# Patient Record
Sex: FEMALE | Race: BLACK OR AFRICAN AMERICAN | NOT HISPANIC OR LATINO | Employment: UNEMPLOYED | URBAN - METROPOLITAN AREA
[De-identification: names, ages, dates, MRNs, and addresses within clinical notes are randomized per-mention and may not be internally consistent; named-entity substitution may affect disease eponyms.]

---
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
## 2024-02-19 ENCOUNTER — TELEPHONE (OUTPATIENT)
Dept: FAMILY MEDICINE | Facility: CLINIC | Age: 19
End: 2024-02-19
Payer: COMMERCIAL

## 2024-02-19 NOTE — TELEPHONE ENCOUNTER
[facility] phone call message- patient requesting to speak with PCP or provider:    PCP: Gina Rowe    Additional Comments: Patient seeking callback from PCP.    Is a call back needed? Yes    Patient informed that it may take up to 2 business days to hear back from PCP:Yes    OK to leave a message on voice mail? Yes    Primary language: English      needed? No    Call taken on February 19, 2024 at 2:56 PM by Sae Sierra

## 2024-02-21 NOTE — TELEPHONE ENCOUNTER
Attempted to reach patient with no answer. Let generic message to call back. Let patient know that the doctor has not seen her in a while and would like her to schedule an appointment in clinic.   Erendira Gallardo RN

## 2024-11-18 ENCOUNTER — OFFICE VISIT (OUTPATIENT)
Dept: FAMILY MEDICINE | Facility: CLINIC | Age: 19
End: 2024-11-18
Payer: COMMERCIAL

## 2024-11-18 VITALS
BODY MASS INDEX: 16.61 KG/M2 | HEART RATE: 86 BPM | OXYGEN SATURATION: 100 % | DIASTOLIC BLOOD PRESSURE: 71 MMHG | SYSTOLIC BLOOD PRESSURE: 107 MMHG | TEMPERATURE: 98.1 F | HEIGHT: 67 IN | RESPIRATION RATE: 17 BRPM | WEIGHT: 105.8 LBS

## 2024-11-18 DIAGNOSIS — R53.83 OTHER FATIGUE: ICD-10-CM

## 2024-11-18 DIAGNOSIS — G43.109 MIGRAINE WITH VISUAL AURA: Primary | ICD-10-CM

## 2024-11-18 RX ORDER — TOPIRAMATE 25 MG/1
25 TABLET, FILM COATED ORAL 2 TIMES DAILY
Qty: 90 TABLET | Refills: 1 | Status: SHIPPED | OUTPATIENT
Start: 2024-11-18

## 2024-11-18 RX ORDER — MAGNESIUM OXIDE 400 MG/1
400 TABLET ORAL DAILY
Qty: 90 TABLET | Refills: 3 | Status: SHIPPED | OUTPATIENT
Start: 2024-11-18

## 2024-11-18 RX ORDER — CHOLECALCIFEROL (VITAMIN D3) 50 MCG
1 TABLET ORAL DAILY
Qty: 365 TABLET | Refills: 0 | Status: SHIPPED | OUTPATIENT
Start: 2024-11-18

## 2024-11-18 RX ORDER — SUMATRIPTAN 50 MG/1
50 TABLET, FILM COATED ORAL
Qty: 90 TABLET | Refills: 0 | Status: SHIPPED | OUTPATIENT
Start: 2024-11-18

## 2024-11-18 ASSESSMENT — ANXIETY QUESTIONNAIRES
5. BEING SO RESTLESS THAT IT IS HARD TO SIT STILL: NOT AT ALL
6. BECOMING EASILY ANNOYED OR IRRITABLE: NOT AT ALL
3. WORRYING TOO MUCH ABOUT DIFFERENT THINGS: NOT AT ALL
IF YOU CHECKED OFF ANY PROBLEMS ON THIS QUESTIONNAIRE, HOW DIFFICULT HAVE THESE PROBLEMS MADE IT FOR YOU TO DO YOUR WORK, TAKE CARE OF THINGS AT HOME, OR GET ALONG WITH OTHER PEOPLE: NOT DIFFICULT AT ALL
7. FEELING AFRAID AS IF SOMETHING AWFUL MIGHT HAPPEN: NOT AT ALL
GAD7 TOTAL SCORE: 1
1. FEELING NERVOUS, ANXIOUS, OR ON EDGE: NOT AT ALL
2. NOT BEING ABLE TO STOP OR CONTROL WORRYING: NOT AT ALL
7. FEELING AFRAID AS IF SOMETHING AWFUL MIGHT HAPPEN: NOT AT ALL
GAD7 TOTAL SCORE: 1
GAD7 TOTAL SCORE: 1
4. TROUBLE RELAXING: SEVERAL DAYS
8. IF YOU CHECKED OFF ANY PROBLEMS, HOW DIFFICULT HAVE THESE MADE IT FOR YOU TO DO YOUR WORK, TAKE CARE OF THINGS AT HOME, OR GET ALONG WITH OTHER PEOPLE?: NOT DIFFICULT AT ALL

## 2024-11-18 ASSESSMENT — PATIENT HEALTH QUESTIONNAIRE - PHQ9
SUM OF ALL RESPONSES TO PHQ QUESTIONS 1-9: 3
10. IF YOU CHECKED OFF ANY PROBLEMS, HOW DIFFICULT HAVE THESE PROBLEMS MADE IT FOR YOU TO DO YOUR WORK, TAKE CARE OF THINGS AT HOME, OR GET ALONG WITH OTHER PEOPLE: SOMEWHAT DIFFICULT
SUM OF ALL RESPONSES TO PHQ QUESTIONS 1-9: 3

## 2024-11-18 ASSESSMENT — PAIN SCALES - GENERAL: PAINLEVEL_OUTOF10: NO PAIN (0)

## 2024-11-18 NOTE — PROGRESS NOTES
Preceptor Attestation:    I discussed the patient with the resident and evaluated the patient in person. I have verified the content of the note, which accurately reflects my assessment of the patient and the plan of care.   Supervising Physician:  Luis A Jack MD.

## 2024-11-18 NOTE — PROGRESS NOTES
Assessment & Plan     Migraine with visual aura  Frontal unilateral headaches with associated vision changes and nausea consistent with migraines with aura occurring at least half of the days per month.  No signs of intracranial abnormality given normal neurologic exam.  Does not appear to be cluster headache or tension headache.  Does not have status migrainosus or intractable headaches. Minimal improvement with magnesium, riboflavin, Tylenol, and ibuprofen.  Discussed preventative and abortive therapies for migraines with patient.  Given patient's low-normal blood pressure, do not recommend beta-blocker as first-line.  No comorbid mental health conditions given PHQ-9 and DAVI-7 scores today.  Considered amitriptyline as possible first-line therapy as well.  Given severity of migraines, recommended starting topiramate at 25 mg with plan to increase to 50 mg in 1 week for prevention.  Given minimal improvement with NSAID therapy, opted to start abortive therapy with sumatriptan 50 mg as needed for migraine headache which she will start today as needed.  Discussed waiting 1 week after starting sumatriptan to start topiramate to discern side effect profile and efficacy.  If no improvement with sumatriptan at 50 mg can increase to 100 mg per dose for up to 200 mg/day.  Can also consider injectable sumatriptan as this has increased efficacy vs switching to a different triptan.  Can also consider antinausea such as Compazine and Reglan as adjunctive therapy.  If no improvement with topiramate at 50 mg, can increase to 100 mg daily.  If intolerable side effects with topiramate, can consider amitriptyline or propranolol as preventative therapy.  - magnesium oxide (MAG-OX) 400 MG tablet; Take 1 tablet (400 mg) by mouth daily.  - riboflavin (B-2-400) 400 MG CAPS; Take 1 capsule by mouth daily.  - SUMAtriptan (IMITREX) 50 MG tablet; Take 1 tablet (50 mg) by mouth at onset of headache for migraine. May repeat in 2 hours. Max  4 tablets/24 hours.  - topiramate (TOPAMAX) 25 MG tablet; Take 1 tablet (25 mg) by mouth 2 times daily. Wait for one week after starting imitrex, then start topamax. Take one tablet (25mg) once daily for one week, then increase to twice daily (50mg total)    Other fatigue  Refill of vitamin D prescribed today.  - vitamin D3 (CHOLECALCIFEROL) 50 mcg (2000 units) tablet; Take 1 tablet (50 mcg) by mouth daily.    Offered COVID-19 and influenza vaccinations - patient declined due to patient preference    Return in about 2 weeks (around 12/2/2024) for Follow up, with me, with any available provider in [facility].    Subjective   Elle is a 19 year old, presenting for the following health issues:  Headache (Every 2 days a migraine starts, frontal area, vision blurs, has orbs with auras, nausea. Has used tylenol and advil)      11/18/2024     8:55 AM   Additional Questions   Roomed by kasandra   Accompanied by self         11/18/2024    Information    services provided? No        HPI     Migraines  -hx of migraines, occurred every once in a while since high school for a few years at least  -occurring half of the days per month  -were happening once per month, now every other day for the past couple weeks  -has to stay home from school  -frontal, behind eyes, eye swelling, sometimes all over but usual the front  -usually left sided  -sometimes sharp, throbbing  -severity 10/10, at least 7-8/10  -last 3-24 hours  -associated nausea, blurred vision, dark spots in vision, photophobia, sound sensitivity  -improved with sleeping in dark room  -no vomiting  -no numbness or tingling, no focal weakness  -no speech difficulties  -tylenol and ibuprofen, place ice on head which all help a little  -no known triggers  -no increased stress  -no changes to activity  -drinks 1-2 cups of coffee per day  -gets 4-5 hours of sleep, feels well-rested most of the time    Review of Systems  Constitutional, HEENT, cardiovascular,  "pulmonary, gi and gu systems are negative, except as otherwise noted.      Objective    /71   Pulse 86   Temp 98.1  F (36.7  C) (Oral)   Resp 17   Ht 1.702 m (5' 7\")   Wt 48 kg (105 lb 12.8 oz)   SpO2 100%   BMI 16.57 kg/m    Body mass index is 16.57 kg/m .  Physical Exam   GENERAL: alert and no distress  EYES: Eyes grossly normal to inspection, PERRL and conjunctivae and sclerae normal  HENT: mouth without ulcers or lesions  NECK: no adenopathy, no asymmetry, masses, or scars  RESP: lungs clear to auscultation - no rales, rhonchi or wheezes  CV: regular rate and rhythm, normal S1 S2, no S3 or S4, no murmur, click or rub, no peripheral edema  MS: no gross musculoskeletal defects noted, no edema  SKIN: no suspicious lesions or rashes  NEURO: Normal strength and tone, mentation intact and speech normal, CN II-XII intact  PSYCH: mentation appears normal, affect normal/bright          Signed Electronically by: Steph Haas MD    Answers submitted by the patient for this visit:  Patient Health Questionnaire (Submitted on 11/18/2024)  If you checked off any problems, how difficult have these problems made it for you to do your work, take care of things at home, or get along with other people?: Somewhat difficult  PHQ9 TOTAL SCORE: 3  Patient Health Questionnaire (G7) (Submitted on 11/18/2024)  DAVI 7 TOTAL SCORE: 1    "

## 2024-11-18 NOTE — PATIENT INSTRUCTIONS
Patient Education   Here is the plan from today's visit    1. Migraine with visual aura (Primary)  Starting today, you CAN take imitrex/sumatriptan when you have a headache/migraine only. You can take one tab at the start and repeat every 2 hours as needed for headache. Do not take more than 4 tabs in 24 hours.    Starting 11/25, take topiramate/topamax one tab once daily for one week. On 12/2, increase the dose to one tab twice per day (one in morning, one at night).     Come back to clinic in 2 weeks to see how the medicines are working and take note of any side effects (most common side effects are drowsiness and nausea).     - magnesium oxide (MAG-OX) 400 MG tablet; Take 1 tablet (400 mg) by mouth daily.  Dispense: 90 tablet; Refill: 3  - riboflavin (B-2-400) 400 MG CAPS; Take 1 capsule by mouth daily.  Dispense: 90 capsule; Refill: 3  - SUMAtriptan (IMITREX) 50 MG tablet; Take 1 tablet (50 mg) by mouth at onset of headache for migraine. May repeat in 2 hours. Max 4 tablets/24 hours.  Dispense: 90 tablet; Refill: 0  - topiramate (TOPAMAX) 25 MG tablet; Take 1 tablet (25 mg) by mouth 2 times daily. Wait for one week after starting imitrex, then start topamax. Take one tablet (25mg) once daily for one week, then increase to twice daily (50mg total)  Dispense: 90 tablet; Refill: 1    2. Other fatigue  Refill of vitamin D.  - vitamin D3 (CHOLECALCIFEROL) 50 mcg (2000 units) tablet; Take 1 tablet (50 mcg) by mouth daily.  Dispense: 365 tablet; Refill: 0    Please call or return to clinic if your symptoms don't go away.    Follow up plan  No follow-ups on file.    Thank you for coming to [facility] today.  Lab Testing:  **If you had lab testing today and your results are reassuring or normal they will be mailed to you or sent through [facility] within 7 days.   **If the lab tests need quick action we will call you with the results.  **If you are having labs done on a different day, please call [phone] to  schedule at [facility] or [phone] for other [facility] locations. Labs do not offer walk-in appointments.  The phone number we will call with results is # [phone] (home) . If this is not the best number please call our clinic and change the number.  Medication Refills:  If you need any refills please call your pharmacy and they will contact us.   If you need to  your refill at a new pharmacy, please contact the new pharmacy directly. The new pharmacy will help you get your medications transferred faster.   Scheduling:  If you have any concerns about today's visit or wish to schedule another appointment please call our office during normal business hours [phone] (8-5:00 M-F). If you can no longer make a scheduled visit, please cancel via [facility] or call us to cancel.   If a referral was made to an [facility] specialty provider and you do not get a call from central scheduling, please refer to directions on your visit summary or call our office during normal business hours for assistance.   If a Mammogram was ordered for you at the [facility] call [phone] to schedule or change your appointment.  If you had an XRay/CT/Ultrasound/MRI ordered the number is [phone] to schedule or change your radiology appointment.   [facility] has limited ultrasound appointments available on Wednesdays, if you would like your ultrasound at [facility], please call [phone] to schedule.   Medical Concerns:  If you have urgent medical concerns please call [phone] at any time of the day.    Steph Haas MD

## 2024-11-28 ASSESSMENT — PATIENT HEALTH QUESTIONNAIRE - PHQ9: SUM OF ALL RESPONSES TO PHQ QUESTIONS 1-9: 2

## 2025-01-13 NOTE — PROGRESS NOTES
"  Assessment & Plan     Current moderate episode of major depressive disorder  Insomnia, unspecified type  Disclosed once interviewed w/o parent in room. PHQ-9 score was 24 today. For Q9, thoughts of better off dead, she answered \"More than half  the days\": endorses thinking she wouldn't mind if she , but does not want to hurt herself and has no plan. Has been feeling really low, not wanting to leave her room. Has also had a hard time sleeping, feeling anxious. Brief hypomania screening negative today but prudent to further assess at follow up. In the past has tried mirtazapine for depression, insomnia, and low appetite, and had success with it. Family dynamics make this difficult, as her parents do not support taking medications for mental health - today we discussed ways to find autonomy in her healthcare including replacing the contact information in her chart with her own, signing up for [employer], finding a pharmacy close to school, and contacting her insurance to sign up for paperless contact. Offered to facilitate discussion in clinic w/ parents, she declines.   - mirtazapine (REMERON) 15 MG tablet  Dispense: 30 tablet; Refill: 0  - plan to send refills to pharmacy close to [facility] so she can  meds discreetly - pt will message and let me know or I will call her cell   - follow up in 4-8 weeks, virtual is okay     Loss of appetite  BMI less than 19,adult  Vitals stable. Overall weight stable past few years but significant drop in . Likely r/t uncontrolled mood d/o but will check labs.  - TSH with free T4 reflex  - Vitamin B12  - Folate  - Comprehensive metabolic panel    Low iron  Found at prior visit, has been taking vitamins occasionally. Rechecking today.  - Ferritin  - Hemoglobin  - Iron & Iron Binding Capacity  - Transferrin    Migraine   Not addressed at length today. Not taking topiramate regularly. Would consider beta blocker as alternative in the future as topiramate SE may adversely " contribute to above sx.     Screening for HIV (human immunodeficiency virus)  Routine screening per CDC guidelines  - HIV Screening    Need for hepatitis C screening test  Routine screening per CDC guidelines  - Hepatitis C Screen Reflex to HCV RNA Quant and Genotype    Return in about 4 weeks (around 2/11/2025).    Shana Almeida is a 19 year old, presenting for the following health issues:  Insomnia (Went to the ED for insomnia and they gave her trazodone 50 Mg to start taking. Has taken 6 nights worth and has no appetite and nausea, dizzy.  Went to [facility] ED -  has dropped weight since 11/18/24, pt states she is feeling weak.)    HPI     Headaches  See November 18 note - given topiramate daily and sumatriptan PRN. Took topiramate for about a week, and then stopped, don't like being dependent on a pill. After this, probably took topiramate once per week.     Insomnia  Jan 9 - went to ED for insomnia. Messed up sleeping schedule, on break - stayed up lots. Eventually couldn't sleep at all, kept happening night after night. Worrying. It's been better - trazodone helped. Takes it every night, helps with sleep.     Weight loss, dizziness  In general, has been a snacker. Now can't make herself eat at all.   Feels nauseous, dizzy. No appetite. All this has been worse over the last 2 weeks - started with a bad headache, and whole body hurt. Not congested.  Has lost weight in the past 2 years, has been sick a lot (vomiting), travelled and thought it was maybe this.  Was 113 in 2019, 2021, last two years has been 95-98.  No missed periods, although the last one was late - maybe because of finals?  This happened in 2022 as well, started on mirtazapine, helped with mood, sleep, and eating.    Mood, family dynamics  Has a history of depression, and hasn't been able to discuss it very openly at visits in the past:  8/25/2023 - in phone call about results after the visit, disclosed depression that she couldn't talk about at  "the visit in front of her mother  12/16/2022 - in confidential discussion, disclosed depression, parents don't want her to be on meds  Her parents take pictures of her AVS, know what meds she's on  Has been really struggling recently with depression, wanting to be in her room for weeks on end.   Thinks she might be better off dead, does not want to hurt herself.  [facility] rest[facility] Jan 27th - communtes there with [facility]         Objective    /84   Pulse 90   Temp 97.9  F (36.6  C) (Oral)   Resp 15   Ht 1.702 m (5' 7\")   Wt 44.7 kg (98 lb 9.6 oz)   SpO2 99%   BMI 15.44 kg/m    Body mass index is 15.44 kg/m .    Physical Exam  Vitals reviewed.   Constitutional:       General: She is not in acute distress.     Appearance: She is not ill-appearing.   HENT:      Head: Normocephalic and atraumatic.   Eyes:      Conjunctiva/sclera: Conjunctivae normal.   Cardiovascular:      Rate and Rhythm: Normal rate.   Pulmonary:      Effort: Pulmonary effort is normal. No respiratory distress.   Neurological:      General: No focal deficit present.      Mental Status: She is alert.   Psychiatric:         Mood and Affect: Affect is tearful (mildly restricted).         Behavior: Behavior normal.          Denise Payne, MS3  [employer]    Signed Electronically by: Adriane Longoria DO    "

## 2025-01-14 ENCOUNTER — OFFICE VISIT (OUTPATIENT)
Dept: FAMILY MEDICINE | Facility: CLINIC | Age: 20
End: 2025-01-14
Payer: COMMERCIAL

## 2025-01-14 VITALS
TEMPERATURE: 97.9 F | DIASTOLIC BLOOD PRESSURE: 84 MMHG | OXYGEN SATURATION: 99 % | HEIGHT: 67 IN | RESPIRATION RATE: 15 BRPM | WEIGHT: 98.6 LBS | BODY MASS INDEX: 15.47 KG/M2 | HEART RATE: 90 BPM | SYSTOLIC BLOOD PRESSURE: 118 MMHG

## 2025-01-14 DIAGNOSIS — Z11.59 NEED FOR HEPATITIS C SCREENING TEST: ICD-10-CM

## 2025-01-14 DIAGNOSIS — F32.1 CURRENT MODERATE EPISODE OF MAJOR DEPRESSIVE DISORDER, UNSPECIFIED WHETHER RECURRENT (H): ICD-10-CM

## 2025-01-14 DIAGNOSIS — G47.00 INSOMNIA, UNSPECIFIED TYPE: ICD-10-CM

## 2025-01-14 DIAGNOSIS — E61.1 LOW IRON: ICD-10-CM

## 2025-01-14 DIAGNOSIS — Z11.4 SCREENING FOR HIV (HUMAN IMMUNODEFICIENCY VIRUS): Primary | ICD-10-CM

## 2025-01-14 LAB
ALBUMIN SERPL BCG-MCNC: 4.7 G/DL (ref 3.5–5.2)
ALP SERPL-CCNC: 54 U/L (ref 40–150)
ALT SERPL W P-5'-P-CCNC: 9 U/L (ref 0–50)
ANION GAP SERPL CALCULATED.3IONS-SCNC: 10 MMOL/L (ref 7–15)
AST SERPL W P-5'-P-CCNC: 20 U/L (ref 0–45)
BILIRUB SERPL-MCNC: 0.6 MG/DL
BUN SERPL-MCNC: 11.1 MG/DL (ref 6–20)
CALCIUM SERPL-MCNC: 9.7 MG/DL (ref 8.8–10.4)
CHLORIDE SERPL-SCNC: 102 MMOL/L (ref 98–107)
CREAT SERPL-MCNC: 0.55 MG/DL (ref 0.51–0.95)
EGFRCR SERPLBLD CKD-EPI 2021: >90 ML/MIN/1.73M2
FERRITIN SERPL-MCNC: 16 NG/ML (ref 6–175)
FOLATE SERPL-MCNC: 15.9 NG/ML (ref 4.6–34.8)
GLUCOSE SERPL-MCNC: 98 MG/DL (ref 70–99)
HCO3 SERPL-SCNC: 24 MMOL/L (ref 22–29)
HGB BLD-MCNC: 12.5 G/DL (ref 11.7–15.7)
HIV 1+2 AB+HIV1 P24 AG SERPL QL IA: NONREACTIVE
IRON BINDING CAPACITY (ROCHE): 390 UG/DL (ref 240–430)
IRON SATN MFR SERPL: 6 % (ref 15–46)
IRON SERPL-MCNC: 24 UG/DL (ref 37–145)
POTASSIUM SERPL-SCNC: 3.5 MMOL/L (ref 3.4–5.3)
PROT SERPL-MCNC: 7.7 G/DL (ref 6.4–8.3)
SODIUM SERPL-SCNC: 136 MMOL/L (ref 135–145)
TRANSFERRIN SERPL-MCNC: 321 MG/DL (ref 200–360)
TSH SERPL DL<=0.005 MIU/L-ACNC: 1.95 UIU/ML (ref 0.3–4.2)
VIT B12 SERPL-MCNC: 832 PG/ML (ref 232–1245)

## 2025-01-14 RX ORDER — MIRTAZAPINE 15 MG/1
15 TABLET, FILM COATED ORAL AT BEDTIME
Qty: 30 TABLET | Refills: 0 | Status: SHIPPED | OUTPATIENT
Start: 2025-01-14

## 2025-01-14 ASSESSMENT — ANXIETY QUESTIONNAIRES
7. FEELING AFRAID AS IF SOMETHING AWFUL MIGHT HAPPEN: NEARLY EVERY DAY
1. FEELING NERVOUS, ANXIOUS, OR ON EDGE: NEARLY EVERY DAY
IF YOU CHECKED OFF ANY PROBLEMS ON THIS QUESTIONNAIRE, HOW DIFFICULT HAVE THESE PROBLEMS MADE IT FOR YOU TO DO YOUR WORK, TAKE CARE OF THINGS AT HOME, OR GET ALONG WITH OTHER PEOPLE: VERY DIFFICULT
GAD7 TOTAL SCORE: 19
2. NOT BEING ABLE TO STOP OR CONTROL WORRYING: NEARLY EVERY DAY
GAD7 TOTAL SCORE: 19
6. BECOMING EASILY ANNOYED OR IRRITABLE: MORE THAN HALF THE DAYS
3. WORRYING TOO MUCH ABOUT DIFFERENT THINGS: NEARLY EVERY DAY
5. BEING SO RESTLESS THAT IT IS HARD TO SIT STILL: MORE THAN HALF THE DAYS

## 2025-01-14 ASSESSMENT — PATIENT HEALTH QUESTIONNAIRE - PHQ9
SUM OF ALL RESPONSES TO PHQ QUESTIONS 1-9: 24
5. POOR APPETITE OR OVEREATING: NEARLY EVERY DAY

## 2025-01-14 ASSESSMENT — PAIN SCALES - GENERAL: PAINLEVEL_OUTOF10: NO PAIN (0)

## 2025-01-15 LAB — HCV AB SERPL QL IA: NONREACTIVE

## 2025-02-09 ENCOUNTER — HEALTH MAINTENANCE LETTER (OUTPATIENT)
Age: 20
End: 2025-02-09

## 2025-02-17 ENCOUNTER — MYC MEDICAL ADVICE (OUTPATIENT)
Dept: FAMILY MEDICINE | Facility: CLINIC | Age: 20
End: 2025-02-17
Payer: COMMERCIAL

## 2025-02-17 DIAGNOSIS — G47.00 INSOMNIA, UNSPECIFIED TYPE: ICD-10-CM

## 2025-02-18 RX ORDER — MIRTAZAPINE 15 MG/1
15 TABLET, FILM COATED ORAL AT BEDTIME
Qty: 90 TABLET | Refills: 3 | Status: SHIPPED | OUTPATIENT
Start: 2025-02-18

## 2025-03-14 NOTE — TELEPHONE ENCOUNTER
Patient called into clinic to schedule a video visit with Dr. Longoria. At the time of the call, Dr. Longoria's first available appointment was not until 5/8/25. Patient is looking for a sooner appointment.    Dr. Longoria, are you ok with patient using one of your ANGELLA slots in order to see patient sooner? If so, Lexie, are you able to reach out to patient to schedule?     Thank you,  Angely Be  Care Coordinator (Supporting front office)

## 2025-03-18 NOTE — TELEPHONE ENCOUNTER
CC attempted to contact patient to schedule video visit with Dr. Longoria. No answer, and CC not able to leave a voicemail because it was not set up yet.     If patient calls back to schedule, OK to use Dr. Longoria's ANGELLA slots and OK to be video.    Angely Be  [facility]  [phone]

## 2025-03-19 NOTE — TELEPHONE ENCOUNTER
"3/20/25 [facility] Team Care Coordinator attempted to reach out to patient to schedule an appointment with . Patient phone is stating that the \"voicemail box has not yet been set up\". CC unable to leave a message. Will send [employer] message today.      Lexie Bravo  Lead Care Coordinator  [facility]  [phone]    "

## 2025-04-03 ENCOUNTER — VIRTUAL VISIT (OUTPATIENT)
Dept: FAMILY MEDICINE | Facility: CLINIC | Age: 20
End: 2025-04-03
Payer: COMMERCIAL

## 2025-04-03 DIAGNOSIS — F32.1 CURRENT MODERATE EPISODE OF MAJOR DEPRESSIVE DISORDER, UNSPECIFIED WHETHER RECURRENT (H): Primary | ICD-10-CM

## 2025-04-03 DIAGNOSIS — G47.00 INSOMNIA, UNSPECIFIED TYPE: ICD-10-CM

## 2025-04-03 RX ORDER — MIRTAZAPINE 30 MG/1
30 TABLET, FILM COATED ORAL AT BEDTIME
Qty: 90 TABLET | Refills: 2 | Status: SHIPPED | OUTPATIENT
Start: 2025-04-03

## 2025-04-03 ASSESSMENT — PATIENT HEALTH QUESTIONNAIRE - PHQ9
SUM OF ALL RESPONSES TO PHQ QUESTIONS 1-9: 6
SUM OF ALL RESPONSES TO PHQ QUESTIONS 1-9: 6
10. IF YOU CHECKED OFF ANY PROBLEMS, HOW DIFFICULT HAVE THESE PROBLEMS MADE IT FOR YOU TO DO YOUR WORK, TAKE CARE OF THINGS AT HOME, OR GET ALONG WITH OTHER PEOPLE: SOMEWHAT DIFFICULT

## 2025-04-03 NOTE — Clinical Note
TOMAS to PCP :)   with video visits and using a pharmacy by [facility], she's able to access treatment for depression w/o having to disclose to parents. Your availability was way better than mine in May so she is seeing you for a video visit. She was very apprehensive about adding an SSRI to mirtazepine or switching meds but that was my thought if her depression still isn't better w/ pushing mirtazepine to goal dose for mood

## 2025-04-03 NOTE — PROGRESS NOTES
Elle is a 20 year old who is being evaluated via a billable video visit.          Assessment & Plan     Current moderate episode of major depressive disorder, unspecified whether recurrent (H)  Insomnia, unspecified type  Patient continues to have depressive thoughts and increased fatigue that have not improved since starting Mirtazapine (15 mg) daily in January. She is noticing improved appetite and improved sleep. Reviewed options to address - used shared decision making with patient to see what options are to help with mood symptoms, including increasing Mirtazapine vs. adding SSRI to augment vs discontinue mirtazepine and start SSRI/SNRI. Patient is apprehensive about side effects w/ med change. Would like to try increasing mirtazapine dose to see if this helps with her mood symptoms and fatigue. Recommend close follow up in one month to see if depression and fatigue have improved.   - mirtazapine (REMERON) 30 MG tablet  Dispense: 90 tablet; Refill: 2 -- offered titrating to 45mg, she prefers titrating to 30mg and reevaluating  - strongly encouraged seeking out counseling services/therapy at [facility]. Offered psychotherapy referral if she's not able to access through [facility]  - video visits and using pharmacy near [facility] are working well for access   -Follow up in about 4 weeks with me or Dr. Rowe whom patient has seen in previous visits at [facility]. Virtual visit is okay.     Depression Screening Follow Up      4/3/2025     2:27 PM   PHQ   PHQ-9 Total Score 6    Q9: Thoughts of better off dead/self-harm past 2 weeks Several days   F/U: Thoughts of suicide or self-harm No   F/U: Safety concerns No       Patient-reported         4/3/2025     2:27 PM   Last PHQ-9   1.  Little interest or pleasure in doing things 1   2.  Feeling down, depressed, or hopeless 1   3.  Trouble falling or staying asleep, or sleeping too much 0   4.  Feeling tired or having little energy 1   5.  Poor appetite or overeating 0   6.   Feeling bad about yourself 0   7.  Trouble concentrating 1   8.  Moving slowly or restless 1   Q9: Thoughts of better off dead/self-harm past 2 weeks 1   PHQ-9 Total Score 6    In the past two weeks have you had thoughts of suicide or self harm? No   Do you have concerns about your personal safety or the safety of others? No       Patient-reported       Follow Up Actions Taken  Discussed the following ways the patient can remain in a safe environment:  be around others    Follow up plan:   Return in about 5 weeks (around 5/7/2025) for Follow up, for mood follow up.      Subjective   Elle is a 20 year old, presenting for the following health issues:  RECHECK        4/3/2025     3:50 PM   Additional Questions   Roomed by Ohn   Accompanied by Self         4/3/2025    Information    services provided? No     Video Start Time:  4:15    HPI      Mirtazapine follow up: Elle has been doing much better. Sleeping and eating better. She still has really hard days where she can't get out of bed. Has to take a break some days and not do anything. This can last from a couple hours to a couple days. Patient is eating throughout the day. Always are small portions but is eating more and snacking more. Eating more meals compared to before. She has not noted any GI side effects. She has been feeling drowsy in the morning and during the day, and is wondering if this could be due to the medication. She has been trying to force herself to go to school but it has been exhausting. Even if she gets a lot of sleep during the weekends, she wakes up and is still tired.     Patient has friends and her sister to help with support. These are the only people who know she is on medication and she can rely on them.     Self harm/depression follow up: Not really having thoughts of self harm. Thoughts of self harm sometimes come up but she does not have plans to act on it. Feels like she is stuck in the same ways she was in terms  of depression, she continues to feel tired.       Objective         Vitals:  No vitals were obtained today due to virtual visit.    Physical Exam   GENERAL: alert and no distress  EYES: Eyes grossly normal to inspection.  No discharge or erythema, or obvious scleral/conjunctival abnormalities.  RESP: No audible wheeze, cough, or visible cyanosis.    SKIN: Visible skin clear. No significant rash, abnormal pigmentation or lesion on visible skin.   NEURO: Cranial nerves grossly intact.  Mentation and speech appropriate for age.  PSYCH: Appropriate affect, tone, and pace of words    Video-Visit Details    Type of service:  Video Visit   Video End Time:5:05 PM  Originating Location (pt. Location): Other [place]    Distant Location (provider location):  On-site  Platform used for Video Visit: Magdalena Rosario MS3    I was present with the medical student who participated in the service and in the documentation of this note. I have verified the history and personally performed the physical exam and medical decision making, and have verified the content of the note, which accurately reflects my assessment of the patient and the plan of care.   Adriane Longoria DO     Signed Electronically by: Adriane Longoria DO

## 2025-07-07 ENCOUNTER — TELEPHONE (OUTPATIENT)
Dept: FAMILY MEDICINE | Facility: CLINIC | Age: 20
End: 2025-07-07